# Patient Record
Sex: MALE | Race: BLACK OR AFRICAN AMERICAN | Employment: OTHER | URBAN - METROPOLITAN AREA
[De-identification: names, ages, dates, MRNs, and addresses within clinical notes are randomized per-mention and may not be internally consistent; named-entity substitution may affect disease eponyms.]

---

## 2024-06-18 ENCOUNTER — ANESTHESIA EVENT (OUTPATIENT)
Facility: HOSPITAL | Age: 69
End: 2024-06-18
Payer: MEDICARE

## 2024-06-18 ENCOUNTER — ANESTHESIA (OUTPATIENT)
Facility: HOSPITAL | Age: 69
End: 2024-06-18
Payer: MEDICARE

## 2024-06-18 ENCOUNTER — HOSPITAL ENCOUNTER (OUTPATIENT)
Facility: HOSPITAL | Age: 69
Setting detail: OUTPATIENT SURGERY
Discharge: HOME | End: 2024-06-18
Attending: INTERNAL MEDICINE | Admitting: INTERNAL MEDICINE
Payer: MEDICARE

## 2024-06-18 VITALS
BODY MASS INDEX: 29.33 KG/M2 | HEIGHT: 69 IN | SYSTOLIC BLOOD PRESSURE: 165 MMHG | TEMPERATURE: 97.5 F | OXYGEN SATURATION: 95 % | WEIGHT: 198 LBS | RESPIRATION RATE: 14 BRPM | HEART RATE: 60 BPM | DIASTOLIC BLOOD PRESSURE: 110 MMHG

## 2024-06-18 PROCEDURE — 2500000003 HC RX 250 WO HCPCS

## 2024-06-18 PROCEDURE — 2709999900 HC NON-CHARGEABLE SUPPLY: Performed by: INTERNAL MEDICINE

## 2024-06-18 PROCEDURE — 88305 TISSUE EXAM BY PATHOLOGIST: CPT

## 2024-06-18 PROCEDURE — 3600007502: Performed by: INTERNAL MEDICINE

## 2024-06-18 PROCEDURE — 7100000011 HC PHASE II RECOVERY - ADDTL 15 MIN: Performed by: INTERNAL MEDICINE

## 2024-06-18 PROCEDURE — 3700000001 HC ADD 15 MINUTES (ANESTHESIA): Performed by: INTERNAL MEDICINE

## 2024-06-18 PROCEDURE — 6360000002 HC RX W HCPCS

## 2024-06-18 PROCEDURE — 3700000000 HC ANESTHESIA ATTENDED CARE: Performed by: INTERNAL MEDICINE

## 2024-06-18 PROCEDURE — 2580000003 HC RX 258: Performed by: INTERNAL MEDICINE

## 2024-06-18 PROCEDURE — 3600007512: Performed by: INTERNAL MEDICINE

## 2024-06-18 PROCEDURE — 7100000010 HC PHASE II RECOVERY - FIRST 15 MIN: Performed by: INTERNAL MEDICINE

## 2024-06-18 RX ORDER — AMLODIPINE AND VALSARTAN 5; 320 MG/1; MG/1
1 TABLET ORAL DAILY
COMMUNITY

## 2024-06-18 RX ORDER — SODIUM CHLORIDE 9 MG/ML
25 INJECTION, SOLUTION INTRAVENOUS PRN
Status: DISCONTINUED | OUTPATIENT
Start: 2024-06-18 | End: 2024-06-18 | Stop reason: HOSPADM

## 2024-06-18 RX ORDER — LIDOCAINE HYDROCHLORIDE 20 MG/ML
INJECTION, SOLUTION EPIDURAL; INFILTRATION; INTRACAUDAL; PERINEURAL PRN
Status: DISCONTINUED | OUTPATIENT
Start: 2024-06-18 | End: 2024-06-18 | Stop reason: SDUPTHER

## 2024-06-18 RX ORDER — SODIUM CHLORIDE 0.9 % (FLUSH) 0.9 %
5-40 SYRINGE (ML) INJECTION EVERY 12 HOURS SCHEDULED
Status: DISCONTINUED | OUTPATIENT
Start: 2024-06-18 | End: 2024-06-18 | Stop reason: HOSPADM

## 2024-06-18 RX ORDER — ROSUVASTATIN CALCIUM 20 MG/1
20 TABLET, COATED ORAL DAILY
COMMUNITY

## 2024-06-18 RX ORDER — SODIUM CHLORIDE 0.9 % (FLUSH) 0.9 %
5-40 SYRINGE (ML) INJECTION PRN
Status: DISCONTINUED | OUTPATIENT
Start: 2024-06-18 | End: 2024-06-18 | Stop reason: HOSPADM

## 2024-06-18 RX ORDER — SODIUM CHLORIDE 9 MG/ML
INJECTION, SOLUTION INTRAVENOUS CONTINUOUS
Status: DISCONTINUED | OUTPATIENT
Start: 2024-06-18 | End: 2024-06-18 | Stop reason: HOSPADM

## 2024-06-18 RX ORDER — ASPIRIN 81 MG/1
81 TABLET ORAL DAILY
COMMUNITY

## 2024-06-18 RX ADMIN — LIDOCAINE HYDROCHLORIDE 45 MG: 20 INJECTION, SOLUTION EPIDURAL; INFILTRATION; INTRACAUDAL; PERINEURAL at 08:32

## 2024-06-18 RX ADMIN — PROPOFOL 30 MG: 10 INJECTION, EMULSION INTRAVENOUS at 08:54

## 2024-06-18 RX ADMIN — PROPOFOL 30 MG: 10 INJECTION, EMULSION INTRAVENOUS at 08:49

## 2024-06-18 RX ADMIN — PROPOFOL 30 MG: 10 INJECTION, EMULSION INTRAVENOUS at 08:38

## 2024-06-18 RX ADMIN — PROPOFOL 30 MG: 10 INJECTION, EMULSION INTRAVENOUS at 08:41

## 2024-06-18 RX ADMIN — PROPOFOL 30 MG: 10 INJECTION, EMULSION INTRAVENOUS at 08:44

## 2024-06-18 RX ADMIN — SODIUM CHLORIDE: 9 INJECTION, SOLUTION INTRAVENOUS at 08:31

## 2024-06-18 RX ADMIN — PROPOFOL 50 MG: 10 INJECTION, EMULSION INTRAVENOUS at 08:32

## 2024-06-18 RX ADMIN — PROPOFOL 50 MG: 10 INJECTION, EMULSION INTRAVENOUS at 08:35

## 2024-06-18 ASSESSMENT — PAIN - FUNCTIONAL ASSESSMENT: PAIN_FUNCTIONAL_ASSESSMENT: 0-10

## 2024-06-18 NOTE — H&P
[place] [id number]          Pre-procedure History and Physical       NAME:  Lars Mccormick   :   1955   MRN:   [id number]     CHIEF COMPLAINT/HPI: colon polyps    PMH:  Past Medical History:   Diagnosis Date    Hyperlipidemia     Hypertension        PSH:  History reviewed. No pertinent surgical history.    Allergies:  No Known Allergies    Home Medications:  Prior to Admission Medications   Prescriptions Last Dose Informant Patient Reported? Taking?   amLODIPine-valsartan (EXFORGE) 5-320 MG per tablet 2024  Yes Yes   Sig: Take 1 tablet by mouth daily   aspirin 81 MG EC tablet 2024  Yes Yes   Sig: Take 1 tablet by mouth daily   rosuvastatin (CRESTOR) 20 MG tablet 2024  Yes Yes   Sig: Take 1 tablet by mouth daily      Facility-Administered Medications: None       Hospital Medications:  Current Facility-Administered Medications   Medication Dose Route Frequency    0.9 % sodium chloride infusion   IntraVENous Continuous    sodium chloride flush 0.9 % injection 5-40 mL  5-40 mL IntraVENous 2 times per day    sodium chloride flush 0.9 % injection 5-40 mL  5-40 mL IntraVENous PRN    0.9 % sodium chloride infusion  25 mL IntraVENous PRN       Family History:  History reviewed. No pertinent family history.    Social History:  Social History     Tobacco Use    Smoking status: Some Days     Types: Cigarettes    Smokeless tobacco: Current   Substance Use Topics    Alcohol use: Yes         PHYSICAL EXAM PRIOR TO SEDATION:  General: Alert, in no acute distress    Lungs:            CTA bilaterally  Heart:  Normal S1, S2    Abdomen: Soft, Non distended, Non tender.  Normoactive bowel sounds.      Assessment:   Stable for sedation administration.  Date of last colonoscopy: 2017, Polyps  Yes    Plan:     Endoscopic procedure with sedation     Signed By: Albert Humphrey MD     2024  8:31 AM

## 2024-06-18 NOTE — ANESTHESIA PRE PROCEDURE
Department of Anesthesiology  Preprocedure Note       Name:  Lars Mccormick   Age:  69 y.o.  :  1955                                          MRN:  [id number]         Date:  2024      Surgeon: Surgeon(s):  Albert Humphrey MD    Procedure: Procedure(s):  COLONOSCOPY    Medications prior to admission:   Prior to Admission medications    Medication Sig Start Date End Date Taking? Authorizing Provider   rosuvastatin (CRESTOR) 20 MG tablet Take 1 tablet by mouth daily   Yes Elsie Diego MD   amLODIPine-valsartan (EXFORGE) 5-320 MG per tablet Take 1 tablet by mouth daily   Yes ProviderElsie MD   aspirin 81 MG EC tablet Take 1 tablet by mouth daily   Yes ProviderElsie MD       Current medications:    Current Facility-Administered Medications   Medication Dose Route Frequency Provider Last Rate Last Admin   • 0.9 % sodium chloride infusion   IntraVENous Continuous Albert Humphrey MD       • sodium chloride flush 0.9 % injection 5-40 mL  5-40 mL IntraVENous 2 times per day Albert Humphrey MD       • sodium chloride flush 0.9 % injection 5-40 mL  5-40 mL IntraVENous PRN Albert Humphrey MD       • 0.9 % sodium chloride infusion  25 mL IntraVENous PRN Albert Humphrey MD           Allergies:  No Known Allergies    Problem List:  There is no problem list on file for this patient.      Past Medical History:        Diagnosis Date   • Hyperlipidemia    • Hypertension        Past Surgical History:  History reviewed. No pertinent surgical history.    Social History:    Social History     Tobacco Use   • Smoking status: Some Days     Types: Cigarettes   • Smokeless tobacco: Current   Substance Use Topics   • Alcohol use: Yes                                Ready to quit: Not Answered  Counseling given: Not Answered      Vital Signs (Current): There were no vitals filed for this visit.                                           BP Readings from Last 3 Encounters:   No data found for BP       NPO

## 2024-06-18 NOTE — ANESTHESIA POSTPROCEDURE EVALUATION
Department of Anesthesiology  Postprocedure Note    Patient: Lars Mccormick  MRN: [id number]  YOB: 1955  Date of evaluation: 6/18/2024    Procedure Summary       Date: 06/18/24 Room / Location: [facility] ENDO 02 / [facility] ENDOSCOPY    Anesthesia Start: 0831 Anesthesia Stop: 0901    Procedure: COLONOSCOPY (Lower GI Region) Diagnosis:       Personal history of colonic polyps      (Personal history of colonic polyps [Z86.010])    Surgeons: Albert Humphrey MD Responsible Provider: Phong Ha Jr., MD    Anesthesia Type: MAC ASA Status: 2            Anesthesia Type: MAC    Jj Phase I: Jj Score: 10    Jj Phase II: Jj Score: 9    Anesthesia Post Evaluation    Patient location during evaluation: PACU  Patient participation: complete - patient participated  Level of consciousness: awake  Airway patency: patent  Nausea & Vomiting: no nausea  Cardiovascular status: blood pressure returned to baseline and hemodynamically stable  Respiratory status: acceptable  Hydration status: stable    No notable events documented.

## 2024-06-18 NOTE — DISCHARGE INSTRUCTIONS
[facility]  Albert Humphrey M.D.  [address] [place], [place] [address] [phone]            COLONOSCOPY DISCHARGE INSTRUCTIONS    Lars Mccormick  [id number]  1955    DISCOMFORT:  Redness at IV site- apply warm compress to area; if redness or soreness persist- contact your physician  There may be a slight amount of blood passed from the rectum  Gaseous discomfort- walking, belching will help relieve any discomfort  You may not operate a vehicle for 12 hours  You may not engage in an occupation involving machinery or appliances for the  rest of today  You may not drink alcoholic beverages for at least 12 hours  Avoid making any critical decisions for at least 24 hours    DIET:   You may resume your normal diet, but some patients find that heavy or large  meals may lead to indigestion or vomiting. I suggest a light meal as first food  intake. I recommend a whole food, plant-based diet for your overall health.    ACTIVITY:  You may resume your normal daily activities.  It is recommended that you spend the remainder of the day resting - avoid any strenuous activity.    CALL M.D. IF ANY SIGN OF:   Increasing pain, nausea, vomiting  Abdominal distension (swelling)  Significant bleeding (oral or rectal)  Fever   Pain in chest area  Shortness of breath    Additional Instructions:   Call Dr. Humphrey if any questions or problems at [phone]   You should receive the biopsy results by phone or mail within 3 weeks, if not, call  my office for the results      Should have a repeat colonoscopy in 3-5 years based on pathology. Colonoscopy showed 6 polyps removed and diverticulosis. No NSAIDs for 2 days.

## 2024-06-18 NOTE — OP NOTE
[facility]  Albert Humphrey M.D.  [address] [place] [id number] [phone]               Colonoscopy Procedure Note    NAME: Lars Mccormick  :  1955  MRN:  [id number]    Indications:   Personal history of colon polyps (screening only)     : Albert Humphrey MD    Referring Provider:  No, Pcp    Staff: Circulator: J Luis Gimenez RN  Endoscopy Technician: Garett Castillo    Prosthetic devices, grafts, tissues, transplant, or devices implanted: none    Medicines:  MAC anesthesia      Procedure Details:  After informed consent was obtained with all risks and benefits of the procedure explained and preprocedure exam completed, the patient was placed in the left lateral decubitus position.  Universal protocol for patient identification was performed and documented in the nursing notes.  Throughout the procedure, the patient's blood pressure was monitored at least every five minutes; pulse, and oxygen saturations were monitored continuously.  All vital signs were documented in the nursing notes.  A digital rectal exam was performed and was normal.  The Olympus videocolonoscope  was inserted in the rectum and carefully advanced to the cecum, which was identified by the ileocecal valve and appendiceal orifice. The colonoscope was slowly withdrawn with careful evaluation between folds. Retroflexion in the rectum and second examination of the right colon was performed; findings and interventions are described below. Procedure start time, extent reached time/cecum time, and procedure end time are documented in the nursing notes.  The quality of preparation was adequate.       Findings:   6 sessile polyps with greatest diameter of 7 mm (1 in the ascending, 3 in the transverse, and 2 in the descending colon) s/p cold snare polypectomy  Moderate L sided diverticulosis    Interventions:    6 complete polypectomy were performed using cold snare  and the polyps were  
- - -

## (undated) DEVICE — SUPPLEMENT DIGESTIVE H2O SOL GI-EASE

## (undated) DEVICE — TRAP SURG QUAD PARABOLA SLOT DSGN SFTY SCRN TRAPEASE

## (undated) DEVICE — SNARE ENDOSCP DIA9MM SHTH DIA2.4MM CLD FOR POLYP EXACTO